# Patient Record
Sex: MALE | Race: WHITE | URBAN - METROPOLITAN AREA
[De-identification: names, ages, dates, MRNs, and addresses within clinical notes are randomized per-mention and may not be internally consistent; named-entity substitution may affect disease eponyms.]

---

## 2021-12-17 ENCOUNTER — TRANSFERRED RECORDS (OUTPATIENT)
Dept: HEALTH INFORMATION MANAGEMENT | Facility: CLINIC | Age: 81
End: 2021-12-17
Payer: COMMERCIAL

## 2021-12-20 ENCOUNTER — TRANSCRIBE ORDERS (OUTPATIENT)
Dept: OTHER | Age: 81
End: 2021-12-20
Payer: COMMERCIAL

## 2021-12-20 DIAGNOSIS — R55 PRE-SYNCOPE: Primary | ICD-10-CM

## 2022-01-04 ENCOUNTER — TELEPHONE (OUTPATIENT)
Dept: CARDIOLOGY | Facility: CLINIC | Age: 82
End: 2022-01-04
Payer: COMMERCIAL

## 2022-02-03 ENCOUNTER — PRE VISIT (OUTPATIENT)
Dept: CARDIOLOGY | Facility: CLINIC | Age: 82
End: 2022-02-03
Payer: COMMERCIAL

## 2022-02-03 NOTE — TELEPHONE ENCOUNTER
RECORDS RECEIVED FROM:   DATE RECEIVED:   NOTES STATUS DETAILS   OFFICE NOTE from referring provider    Care Everywhere 12-17-21 Dr. Sandor Caicedo   OFFICE NOTE from other cardiologist    N/A    DISCHARGE SUMMARY from hospital    N/A    DISCHARGE REPORT from the ER   N/A    OPERATIVE REPORT    N/A    MEDICATION LIST   Internal    LABS     BMP   Care Everywhere 12-17-21   CBC   Care Everywhere 12-17-21   CMP   Care Everywhere 12-15-20   Lipids   Care Everywhere 12-17-21   TSH   Care Everywhere 12-21-20   DIAGNOSTIC PROCEDURES     EKG   N/A    Monitor Reports   N/A    IMAGING (DISC & REPORT)      Echo   N/A    Stress Tests   N/A    Cath   N/A    MRI/MRA   N/A    CT/CTA   N/A

## 2022-02-08 ENCOUNTER — OFFICE VISIT (OUTPATIENT)
Dept: CARDIOLOGY | Facility: CLINIC | Age: 82
End: 2022-02-08
Attending: INTERNAL MEDICINE
Payer: COMMERCIAL

## 2022-02-08 VITALS
WEIGHT: 215.4 LBS | HEIGHT: 72 IN | BODY MASS INDEX: 29.17 KG/M2 | SYSTOLIC BLOOD PRESSURE: 129 MMHG | DIASTOLIC BLOOD PRESSURE: 80 MMHG | OXYGEN SATURATION: 97 % | HEART RATE: 59 BPM

## 2022-02-08 DIAGNOSIS — R55 PRE-SYNCOPE: Primary | ICD-10-CM

## 2022-02-08 DIAGNOSIS — I25.5 ISCHEMIC CARDIOMYOPATHY: ICD-10-CM

## 2022-02-08 DIAGNOSIS — I21.21 ST ELEVATION MYOCARDIAL INFARCTION INVOLVING LEFT CIRCUMFLEX CORONARY ARTERY (H): ICD-10-CM

## 2022-02-08 DIAGNOSIS — Z98.61 STATUS POST PERCUTANEOUS TRANSLUMINAL CORONARY ANGIOPLASTY: ICD-10-CM

## 2022-02-08 LAB
ATRIAL RATE - MUSE: 59 BPM
DIASTOLIC BLOOD PRESSURE - MUSE: NORMAL MMHG
INTERPRETATION ECG - MUSE: NORMAL
P AXIS - MUSE: 35 DEGREES
PR INTERVAL - MUSE: 170 MS
QRS DURATION - MUSE: 106 MS
QT - MUSE: 408 MS
QTC - MUSE: 403 MS
R AXIS - MUSE: -13 DEGREES
SYSTOLIC BLOOD PRESSURE - MUSE: NORMAL MMHG
T AXIS - MUSE: 39 DEGREES
VENTRICULAR RATE- MUSE: 59 BPM

## 2022-02-08 PROCEDURE — 99205 OFFICE O/P NEW HI 60 MIN: CPT | Performed by: INTERNAL MEDICINE

## 2022-02-08 PROCEDURE — 93005 ELECTROCARDIOGRAM TRACING: CPT

## 2022-02-08 PROCEDURE — G0463 HOSPITAL OUTPT CLINIC VISIT: HCPCS | Mod: 25

## 2022-02-08 RX ORDER — PSYLLIUM SEED (WITH DEXTROSE)
1 POWDER (GRAM) ORAL
COMMUNITY

## 2022-02-08 RX ORDER — NITROGLYCERIN 0.4 MG/1
TABLET SUBLINGUAL
COMMUNITY
Start: 2021-06-15

## 2022-02-08 RX ORDER — SENNOSIDES 8.6 MG
650 CAPSULE ORAL EVERY 8 HOURS PRN
COMMUNITY

## 2022-02-08 RX ORDER — SIMVASTATIN 40 MG
40 TABLET ORAL
COMMUNITY
Start: 2021-12-17

## 2022-02-08 RX ORDER — ASPIRIN 325 MG
325 TABLET ORAL
COMMUNITY

## 2022-02-08 ASSESSMENT — MIFFLIN-ST. JEOR: SCORE: 1717.67

## 2022-02-08 ASSESSMENT — PAIN SCALES - GENERAL: PAINLEVEL: NO PAIN (0)

## 2022-02-08 NOTE — LETTER
Date:February 12, 2022      Patient was self referred, no letter generated. Do not send.        [facility] Health Information

## 2022-02-08 NOTE — LETTER
2/8/2022      RE: Nadya Mcclain  [address] [place] [address]       Dear Colleague,    Thank you for the opportunity to participate in the care of your patient, Nadya Mcclain, at the [facility] [place] [facility]. Please see a copy of my visit note below.       SUBJECTIVE:  Nadya Mcclain is a 81 year old male who presents for evaluation of intermittent dizzy spells.  Currently he is very healthy 81-year-old male. Exercises regularly without cardiac symptoms.  His cardiac history is significant for some chest symptoms and fatigue while sitting on a deer  2008. On 11 9 patient attended local hospital. He was transferred to [facility]. Patient had a lateral wall ST elevation MI. He had a bare-metal stent to distal circumflex coronary artery. Angiogram showed around 30% LAD disease and no significant RCA disease. On 1111 patient returned with chest pain. Patient had repeat angiogram and 3 drug-eluting stents to the same stented area. Echocardiogram at that time showed an EF of 50 to 55% with inferior lateral wall motion abnormality. Since then patient remain active and asymptomatic. Had no cardiac complaints today.  Is made major complaint is intermittent dizziness lasting for a minute or so. Started 3 years ago. The frequency is less but at least one episode in every month or every 1-1/2 months. He transiently feeling dizzy for his minute mostly when standing or sitting. Never passed out. This is not associated with any type of cardiac symptoms. Patient had evaluations including neurology and ENT with no obvious cause. Patient here for further evaluation.  He do not have significant cardiac risk factors. Only cardiac medication he is taking is simvastatin 40 mg daily.    There are no problems to display for this patient.   .  Current Outpatient Medications   Medication Sig     acetaminophen (TYLENOL 8 HOUR ARTHRITIS PAIN)  650 MG CR tablet Take 650 mg by mouth every 8 hours as needed for mild pain or fever     aspirin (ASA) 325 MG tablet Take 325 mg by mouth     Multiple Vitamins-Minerals (CENTRUM SILVER 50+MEN PO) Take 1 tablet by mouth     nitroGLYcerin (NITROSTAT) 0.4 MG sublingual tablet DISSOLVE ONE TABLET UNDER THE TONGUE EVERY 5 MINUTES AS NEEDED FOR CHEST PAIN.  DO NOT EXCEED A TOTAL OF 3 DOSES IN 15 MINUTES     Psyllium (METAMUCIL) 48.57 % POWD Take 1 Tbsp by mouth     simvastatin (ZOCOR) 40 MG tablet Take 40 mg by mouth     No current facility-administered medications for this visit.     No past medical history on file.  No past surgical history on file.  No Known Allergies  Social History     Socioeconomic History     Marital status:      Spouse name: Not on file     Number of children: Not on file     Years of education: Not on file     Highest education level: Not on file   Occupational History     Not on file   Tobacco Use     Smoking status: Former Smoker     Packs/day: 1.00     Years: 20.00     Pack years: 20.00     Smokeless tobacco: Never Used   Substance and Sexual Activity     Alcohol use: Not on file     Drug use: Not on file     Sexual activity: Not on file   Other Topics Concern     Not on file   Social History Narrative     Not on file     Social Determinants of Health     Financial Resource Strain: Not on file   Food Insecurity: Not on file   Transportation Needs: Not on file   Physical Activity: Not on file   Stress: Not on file   Social Connections: Not on file   Intimate Partner Violence: Not on file   Housing Stability: Not on file     No family history on file.       REVIEW OF SYSTEMS:  General: negative, fever, chills, night sweats  Skin: negative, acne, rash and scaling  Eyes: negative, double vision, eye pain and photophobia  Ears/Nose/Throat: negative, nasal congestion and purulent rhinorrhea  Respiratory: No dyspnea on exertion, No cough, No hemoptysis and negative  Cardiovascular: negative,  "palpitations, tachycardia, irregular heart beat, chest pain, exertional chest pain or pressure, paroxysmal nocturnal dyspnea, dyspnea on exertion and orthopnea       OBJECTIVE:  Blood pressure 129/80, pulse 59, height 1.825 m (5' 11.85\"), weight 97.7 kg (215 lb 6.4 oz), SpO2 97 %.  General Appearance: healthy, alert, active and no distress  Head: Normocephalic. No masses, lesions, tenderness or abnormalities  Eyes: conjuctiva clear, PERRL, EOM intact  Ears: External ears normal. Canals clear. TM's normal.  Nose: Nares normal  Mouth: normal  Neck: Supple, no cervical adenopathy, no thyromegaly  Lungs: clear to auscultation  Cardiac: regular rate and rhythm, normal S1 and S2, no murmur         ASSESSMENT/plan:  Patient here for evaluation of intermittent dizziness.\Symptoms started 3 years ago. Frequency is decreasing about 1 episode every 1 to 1-1/2 months.  While sitting or standing he transiently feeling dizzy. This is not associated with any significant cardiac symptoms. He becomes unsteady but never passed out.  Had ENT and neurology evaluation with no significant findings.  Patient here for inquiring about further cardiac testing.  His concerns are postural hypotension and was asking about tilt table test.  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  His cardiac history is significant for lateral wall STEMI in 2008. Had a bare-metal stent to distal circumflex coronary artery. This was on November 9, 2008. Return again on November 11, 2008 with chest pain and lateral wall ST elevation. Patient had repeat angiogram and 3 drug-eluting stents to circumflex coronary artery. Echocardiogram showed an EF of 50 to 55% with lateral wall motion abnormality. No significant valvular abnormalities.  Patient also had a Zio patch in 2019. This showed no significant arrhythmia though her heart rate is reported as up to 27 bpm but he had Wenckebach heart block.  He has no significant " cardiac risk factors. Current medication is only simvastatin 40 mg daily.  Patient needed further evaluation. As the frequency of his episodes are so far apart he may need implantable loop recorder. Also may need ischemia evaluation and may even plan for a tilt table test.  As patient is coming from far away he was requesting whether these evaluations can be done at [facility].  Will discuss with [place] cardiology and plan for further evaluation.  Per orders.   Return to Clinic as needed.  Total visit duration 60 minutes. This include face-to-face interview, physical exam, chart review, review of Care Everywhere records including coronary angiogram EKG, echocardiogram, Zio patch and documentation.      Please do not hesitate to contact me if you have any questions/concerns.     Sincerely,     ABDOULAYE Hernandez MD

## 2022-02-08 NOTE — NURSING NOTE
Chief Complaint   Patient presents with     New Patient     new - referral by Sand[facility]     Vitals were taken, medications reconciled, and EKG performed.    Loki Dodd  9:56 AM

## 2022-02-10 NOTE — PROGRESS NOTES
SUBJECTIVE:  Nadya Mcclain is a 81 year old male who presents for evaluation of intermittent dizzy spells.  Currently he is very healthy 81-year-old male. Exercises regularly without cardiac symptoms.  His cardiac history is significant for some chest symptoms and fatigue while sitting on a deer  2008. On 11 9 patient attended local hospital. He was transferred to [facility]. Patient had a lateral wall ST elevation MI. He had a bare-metal stent to distal circumflex coronary artery. Angiogram showed around 30% LAD disease and no significant RCA disease. On 1111 patient returned with chest pain. Patient had repeat angiogram and 3 drug-eluting stents to the same stented area. Echocardiogram at that time showed an EF of 50 to 55% with inferior lateral wall motion abnormality. Since then patient remain active and asymptomatic. Had no cardiac complaints today.  Is made major complaint is intermittent dizziness lasting for a minute or so. Started 3 years ago. The frequency is less but at least one episode in every month or every 1-1/2 months. He transiently feeling dizzy for his minute mostly when standing or sitting. Never passed out. This is not associated with any type of cardiac symptoms. Patient had evaluations including neurology and ENT with no obvious cause. Patient here for further evaluation.  He do not have significant cardiac risk factors. Only cardiac medication he is taking is simvastatin 40 mg daily.    There are no problems to display for this patient.   .  Current Outpatient Medications   Medication Sig     acetaminophen (TYLENOL 8 HOUR ARTHRITIS PAIN) 650 MG CR tablet Take 650 mg by mouth every 8 hours as needed for mild pain or fever     aspirin (ASA) 325 MG tablet Take 325 mg by mouth     Multiple Vitamins-Minerals (CENTRUM SILVER 50+MEN PO) Take 1 tablet by mouth     nitroGLYcerin (NITROSTAT) 0.4 MG sublingual tablet DISSOLVE ONE TABLET UNDER THE TONGUE EVERY 5 MINUTES AS NEEDED FOR  "CHEST PAIN.  DO NOT EXCEED A TOTAL OF 3 DOSES IN 15 MINUTES     Psyllium (METAMUCIL) 48.57 % POWD Take 1 Tbsp by mouth     simvastatin (ZOCOR) 40 MG tablet Take 40 mg by mouth     No current facility-administered medications for this visit.     No past medical history on file.  No past surgical history on file.  No Known Allergies  Social History     Socioeconomic History     Marital status:      Spouse name: Not on file     Number of children: Not on file     Years of education: Not on file     Highest education level: Not on file   Occupational History     Not on file   Tobacco Use     Smoking status: Former Smoker     Packs/day: 1.00     Years: 20.00     Pack years: 20.00     Smokeless tobacco: Never Used   Substance and Sexual Activity     Alcohol use: Not on file     Drug use: Not on file     Sexual activity: Not on file   Other Topics Concern     Not on file   Social History Narrative     Not on file     Social Determinants of Health     Financial Resource Strain: Not on file   Food Insecurity: Not on file   Transportation Needs: Not on file   Physical Activity: Not on file   Stress: Not on file   Social Connections: Not on file   Intimate Partner Violence: Not on file   Housing Stability: Not on file     No family history on file.       REVIEW OF SYSTEMS:  General: negative, fever, chills, night sweats  Skin: negative, acne, rash and scaling  Eyes: negative, double vision, eye pain and photophobia  Ears/Nose/Throat: negative, nasal congestion and purulent rhinorrhea  Respiratory: No dyspnea on exertion, No cough, No hemoptysis and negative  Cardiovascular: negative, palpitations, tachycardia, irregular heart beat, chest pain, exertional chest pain or pressure, paroxysmal nocturnal dyspnea, dyspnea on exertion and orthopnea       OBJECTIVE:  Blood pressure 129/80, pulse 59, height 1.825 m (5' 11.85\"), weight 97.7 kg (215 lb 6.4 oz), SpO2 97 %.  General Appearance: healthy, alert, active and no " distress  Head: Normocephalic. No masses, lesions, tenderness or abnormalities  Eyes: conjuctiva clear, PERRL, EOM intact  Ears: External ears normal. Canals clear. TM's normal.  Nose: Nares normal  Mouth: normal  Neck: Supple, no cervical adenopathy, no thyromegaly  Lungs: clear to auscultation  Cardiac: regular rate and rhythm, normal S1 and S2, no murmur         ASSESSMENT/plan:  Patient here for evaluation of intermittent dizziness.\Symptoms started 3 years ago. Frequency is decreasing about 1 episode every 1 to 1-1/2 months.  While sitting or standing he transiently feeling dizzy. This is not associated with any significant cardiac symptoms. He becomes unsteady but never passed out.  Had ENT and neurology evaluation with no significant findings.  Patient here for inquiring about further cardiac testing.  His concerns are postural hypotension and was asking about tilt table test.  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  His cardiac history is significant for lateral wall STEMI in 2008. Had a bare-metal stent to distal circumflex coronary artery. This was on November 9, 2008. Return again on November 11, 2008 with chest pain and lateral wall ST elevation. Patient had repeat angiogram and 3 drug-eluting stents to circumflex coronary artery. Echocardiogram showed an EF of 50 to 55% with lateral wall motion abnormality. No significant valvular abnormalities.  Patient also had a Zio patch in 2019. This showed no significant arrhythmia though her heart rate is reported as up to 27 bpm but he had Wenckebach heart block.  He has no significant cardiac risk factors. Current medication is only simvastatin 40 mg daily.  Patient needed further evaluation. As the frequency of his episodes are so far apart he may need implantable loop recorder. Also may need ischemia evaluation and may even plan for a tilt table test.  As patient is coming from far away he was requesting whether  these evaluations can be done at [facility].  Will discuss with [place] cardiology and plan for further evaluation.  Per orders.   Return to Clinic as needed.  Total visit duration 60 minutes. This include face-to-face interview, physical exam, chart review, review of Care Everywhere records including coronary angiogram EKG, echocardiogram, Zio patch and documentation.

## 2022-03-16 ENCOUNTER — TELEPHONE (OUTPATIENT)
Dept: CARDIOLOGY | Facility: CLINIC | Age: 82
End: 2022-03-16
Payer: COMMERCIAL

## 2023-01-13 PROCEDURE — 88305 TISSUE EXAM BY PATHOLOGIST: CPT | Mod: TC,ORL | Performed by: DERMATOLOGY

## 2023-01-17 ENCOUNTER — LAB REQUISITION (OUTPATIENT)
Dept: LAB | Facility: CLINIC | Age: 83
End: 2023-01-17
Payer: COMMERCIAL

## 2023-01-18 LAB
PATH REPORT.COMMENTS IMP SPEC: NORMAL
PATH REPORT.COMMENTS IMP SPEC: NORMAL
PATH REPORT.FINAL DX SPEC: NORMAL
PATH REPORT.GROSS SPEC: NORMAL
PATH REPORT.MICROSCOPIC SPEC OTHER STN: NORMAL
PATH REPORT.RELEVANT HX SPEC: NORMAL

## 2023-01-18 PROCEDURE — 88305 TISSUE EXAM BY PATHOLOGIST: CPT | Mod: 26 | Performed by: DERMATOLOGY

## 2025-04-23 ENCOUNTER — TELEPHONE (OUTPATIENT)
Dept: PULMONOLOGY | Facility: CLINIC | Age: 85
End: 2025-04-23
Payer: COMMERCIAL

## 2025-04-23 NOTE — TELEPHONE ENCOUNTER
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 Yes    In review of apparent records and conversation with patient; recommend first visit with [facility] provider be conducted :  In person visit  Testing needed: Full PFT's and walk only    Additional notes:

## 2025-09-04 ENCOUNTER — LAB (OUTPATIENT)
Dept: LAB | Facility: CLINIC | Age: 85
End: 2025-09-04
Payer: COMMERCIAL

## 2025-09-04 DIAGNOSIS — J84.9 ILD (INTERSTITIAL LUNG DISEASE) (H): ICD-10-CM

## 2025-09-04 LAB
6 MIN WALK (FT): 870 FT
6 MIN WALK (M): 1295 M
ALBUMIN SERPL BCG-MCNC: 3.9 G/DL (ref 3.5–5.2)
ALP SERPL-CCNC: 62 U/L (ref 40–150)
ALT SERPL W P-5'-P-CCNC: 29 U/L (ref 0–70)
ANION GAP SERPL CALCULATED.3IONS-SCNC: 10 MMOL/L (ref 7–15)
AST SERPL W P-5'-P-CCNC: 27 U/L (ref 0–45)
BASOPHILS # BLD AUTO: <0.03 10E3/UL (ref 0–0.2)
BASOPHILS NFR BLD AUTO: 0.2 %
BILIRUB SERPL-MCNC: 0.4 MG/DL
BUN SERPL-MCNC: 13.2 MG/DL (ref 8–23)
CALCIUM SERPL-MCNC: 9.1 MG/DL (ref 8.8–10.4)
CHLORIDE SERPL-SCNC: 99 MMOL/L (ref 98–107)
CREAT SERPL-MCNC: 0.87 MG/DL (ref 0.67–1.17)
DLCOCOR-%PRED-PRE: 35 %
DLCOCOR-PRE: 8.67 ML/MIN/MMHG
DLCOUNC-%PRED-PRE: 36 %
DLCOUNC-PRE: 8.83 ML/MIN/MMHG
DLCOUNC-PRED: 24.34 ML/MIN/MMHG
EGFRCR SERPLBLD CKD-EPI 2021: 85 ML/MIN/1.73M2
EOSINOPHIL # BLD AUTO: <0.03 10E3/UL (ref 0–0.7)
EOSINOPHIL NFR BLD AUTO: 0.1 %
ERV-%PRED-PRE: 84 %
ERV-PRE: 1.1 L
ERV-PRED: 1.3 L
ERYTHROCYTE [DISTWIDTH] IN BLOOD BY AUTOMATED COUNT: 15.1 % (ref 10–15)
EXPTIME-PRE: 6.64 SEC
FEF2575-%PRED-PRE: 172 %
FEF2575-PRE: 3.11 L/SEC
FEF2575-PRED: 1.8 L/SEC
FEFMAX-%PRED-PRE: 138 %
FEFMAX-PRE: 9.35 L/SEC
FEFMAX-PRED: 6.76 L/SEC
FEV1-%PRED-PRE: 85 %
FEV1-PRE: 2.36 L
FEV1FEV6-PRE: 86 %
FEV1FEV6-PRED: 76 %
FEV1FVC-PRE: 86 %
FEV1FVC-PRED: 75 %
FEV1SVC-PRE: 96 L
FEV1SVC-PRED: 67 L
FIFMAX-PRE: 5.47 L/SEC
FRCPLETH-%PRED-PRE: 74 %
FRCPLETH-PRE: 3.14 L
FRCPLETH-PRED: 4.2 L
FVC-%PRED-PRE: 72 %
FVC-PRE: 2.76 L
FVC-PRED: 3.83 L
GAW-PRED: 1.03 L/S/CMH2O
GLUCOSE SERPL-MCNC: 145 MG/DL (ref 70–99)
HCO3 SERPL-SCNC: 28 MMOL/L (ref 22–29)
HCT VFR BLD AUTO: 42.8 % (ref 40–53)
HGB BLD-MCNC: 13.8 G/DL (ref 13.3–17.7)
IC-%PRED-PRE: 44 %
IC-PRE: 1.32 L
IC-PRED: 2.93 L
IMM GRANULOCYTES # BLD: 0.06 10E3/UL
IMM GRANULOCYTES NFR BLD: 0.6 %
LYMPHOCYTES # BLD AUTO: 0.99 10E3/UL (ref 0.8–5.3)
LYMPHOCYTES NFR BLD AUTO: 9.9 %
Lab: 114 %
MCH RBC QN AUTO: 30.9 PG (ref 26.5–33)
MCHC RBC AUTO-ENTMCNC: 32.2 G/DL (ref 31.5–36.5)
MCV RBC AUTO: 95.7 FL (ref 78–100)
MONOCYTES # BLD AUTO: 0.4 10E3/UL (ref 0–1.3)
MONOCYTES NFR BLD AUTO: 4 %
NEUTROPHILS # BLD AUTO: 8.5 10E3/UL (ref 1.6–8.3)
NEUTROPHILS NFR BLD AUTO: 85.2 %
NRBC # BLD AUTO: <0.03 10E3/UL
NRBC BLD AUTO-RTO: 0 /100
PLATELET # BLD AUTO: 185 10E3/UL (ref 150–450)
POTASSIUM SERPL-SCNC: 5 MMOL/L (ref 3.4–5.3)
PROT SERPL-MCNC: 7 G/DL (ref 6.4–8.3)
RBC # BLD AUTO: 4.47 10E6/UL (ref 4.4–5.9)
RVPLETH-%PRED-PRE: 68 %
RVPLETH-PRE: 1.98 L
RVPLETH-PRED: 2.89 L
SGAW-PRED: 0.2 1/CMH2O*S
SODIUM SERPL-SCNC: 137 MMOL/L (ref 135–145)
SRAW-PRED: < 4.76 CMH2O*S
TLCPLETH-%PRED-PRE: 61 %
TLCPLETH-PRE: 4.45 L
TLCPLETH-PRED: 7.3 L
VA-%PRED-PRE: 51 %
VA-PRE: 3.32 L
VC-%PRED-PRE: 59 %
VC-PRE: 2.47 L
VC-PRED: 4.14 L
WBC # BLD AUTO: 9.98 10E3/UL (ref 4–11)